# Patient Record
Sex: MALE | Race: WHITE | Employment: UNEMPLOYED | URBAN - METROPOLITAN AREA
[De-identification: names, ages, dates, MRNs, and addresses within clinical notes are randomized per-mention and may not be internally consistent; named-entity substitution may affect disease eponyms.]

---

## 2018-12-11 ENCOUNTER — HOSPITAL ENCOUNTER (EMERGENCY)
Age: 28
Discharge: HOME | End: 2018-12-11
Attending: EMERGENCY MEDICINE
Payer: SELF-PAY

## 2018-12-11 VITALS
SYSTOLIC BLOOD PRESSURE: 111 MMHG | DIASTOLIC BLOOD PRESSURE: 53 MMHG | RESPIRATION RATE: 16 BRPM | BODY MASS INDEX: 18.83 KG/M2 | HEIGHT: 67 IN | OXYGEN SATURATION: 98 % | TEMPERATURE: 98.8 F | WEIGHT: 120 LBS | HEART RATE: 63 BPM

## 2018-12-11 DIAGNOSIS — T07.XXXA MULTIPLE ABRASIONS: ICD-10-CM

## 2018-12-11 DIAGNOSIS — S51.812A LACERATION OF LEFT FOREARM, INITIAL ENCOUNTER: Primary | ICD-10-CM

## 2018-12-11 PROCEDURE — 77030018836 HC SOL IRR NACL ICUM -A

## 2018-12-11 PROCEDURE — 99283 EMERGENCY DEPT VISIT LOW MDM: CPT

## 2018-12-11 PROCEDURE — 74011000250 HC RX REV CODE- 250: Performed by: PHYSICIAN ASSISTANT

## 2018-12-11 PROCEDURE — 77030031132 HC SUT NYL COVD -A

## 2018-12-11 PROCEDURE — 75810000293 HC SIMP/SUPERF WND  RPR

## 2018-12-11 PROCEDURE — 74011250637 HC RX REV CODE- 250/637: Performed by: PHYSICIAN ASSISTANT

## 2018-12-11 RX ORDER — IBUPROFEN 800 MG/1
800 TABLET ORAL
Qty: 20 TAB | Refills: 0 | Status: SHIPPED | OUTPATIENT
Start: 2018-12-11 | End: 2018-12-18

## 2018-12-11 RX ORDER — BACITRACIN 500 UNIT/G
1 PACKET (EA) TOPICAL
Status: COMPLETED | OUTPATIENT
Start: 2018-12-11 | End: 2018-12-11

## 2018-12-11 RX ORDER — ACETAMINOPHEN AND CODEINE PHOSPHATE 300; 30 MG/1; MG/1
1 TABLET ORAL
Qty: 4 TAB | Refills: 0 | Status: SHIPPED | OUTPATIENT
Start: 2018-12-11 | End: 2019-07-14

## 2018-12-11 RX ORDER — IBUPROFEN 400 MG/1
800 TABLET ORAL
Status: COMPLETED | OUTPATIENT
Start: 2018-12-11 | End: 2018-12-11

## 2018-12-11 RX ADMIN — IBUPROFEN 800 MG: 400 TABLET ORAL at 18:38

## 2018-12-11 RX ADMIN — BACITRACIN ZINC 1 PACKET: 500 OINTMENT TOPICAL at 19:15

## 2018-12-11 NOTE — DISCHARGE INSTRUCTIONS
Cuts Closed With Stitches: Care Instructions  Your Care Instructions  A cut can happen anywhere on your body. The doctor used stitches to close the cut. Using stitches also helps the cut heal and reduces scarring. Sometimes pieces of tape called Steri-Strips are put over the stitches. If the cut went deep and through the skin, the doctor may have put in two layers of stitches. The deeper layer brings the deep part of the cut together. These stitches will dissolve and don't need to be removed. The stitches in the upper layer are the ones you see on the cut. You will probably have a bandage over the stitches. You will need to have the stitches removed, usually in 7 to 14 days. The doctor has checked you carefully, but problems can develop later. If you notice any problems or new symptoms, get medical treatment right away. Follow-up care is a key part of your treatment and safety. Be sure to make and go to all appointments, and call your doctor if you are having problems. It's also a good idea to know your test results and keep a list of the medicines you take. How can you care for yourself at home? · Keep the cut dry for the first 24 to 48 hours. After this, you can shower if your doctor okays it. Pat the cut dry. · Don't soak the cut, such as in a bathtub. Your doctor will tell you when it's safe to get the cut wet. · If your doctor told you how to care for your cut, follow your doctor's instructions. If you did not get instructions, follow this general advice:  ? After the first 24 to 48 hours, wash around the cut with clean water 2 times a day. Don't use hydrogen peroxide or alcohol, which can slow healing. ? You may cover the cut with a thin layer of petroleum jelly, such as Vaseline, and a nonstick bandage. ? Apply more petroleum jelly and replace the bandage as needed. · Prop up the sore area on a pillow anytime you sit or lie down during the next 3 days.  Try to keep it above the level of your heart. This will help reduce swelling. · Avoid any activity that could cause your cut to reopen. · Do not remove the stitches on your own. Your doctor will tell you when to come back to have the stitches removed. · Leave Steri-Strips on until they fall off. · Be safe with medicines. Read and follow all instructions on the label. ? If the doctor gave you a prescription medicine for pain, take it as prescribed. ? If you are not taking a prescription pain medicine, ask your doctor if you can take an over-the-counter medicine. When should you call for help? Call your doctor now or seek immediate medical care if:    · You have new pain, or your pain gets worse.     · The skin near the cut is cold or pale or changes color.     · You have tingling, weakness, or numbness near the cut.     · The cut starts to bleed, and blood soaks through the bandage. Oozing small amounts of blood is normal.     · You have trouble moving the area near the cut.     · You have symptoms of infection, such as:  ? Increased pain, swelling, warmth, or redness around the cut.  ? Red streaks leading from the cut.  ? Pus draining from the cut.  ? A fever.    Watch closely for changes in your health, and be sure to contact your doctor if:    · The cut reopens.     · You do not get better as expected. Where can you learn more? Go to http://sid-isabel.info/. Enter R217 in the search box to learn more about \"Cuts Closed With Stitches: Care Instructions. \"  Current as of: November 20, 2017  Content Version: 11.8  © 6593-0691 [employer]. Care instructions adapted under license by [employer] (which disclaims liability or warranty for this information). If you have questions about a medical condition or this instruction, always ask your healthcare professional. [address] any warranty or liability for your use of this information.

## 2018-12-11 NOTE — ED PROVIDER NOTES
The history is provided by the patient. Laceration    The incident occurred less than 1 hour ago. Pain location: Left forearm. The laceration is 1 cm in size. The injury mechanism is a metal edge (Pt was cutting fishing line for crafting and cut his arm with scissors. ). Foreign body present: no. The pain is at a severity of 8/10. The pain has been constant since onset. Pertinent negatives include no numbness, no tingling, no weakness, no loss of motion, no coolness and no discoloration. The patient's last tetanus shot was less than 5 years (6 months ago) ago. Pt has not tried anything for pain, movement worsens pain. Also has several surrounding scratches. NO other complaints at this time. Past Medical History:   Diagnosis Date    [facility]     Tourette's disease        History reviewed. No pertinent surgical history. History reviewed. No pertinent family history. Social History     Socioeconomic History    Marital status: SINGLE     Spouse name: Not on file    Number of children: Not on file    Years of education: Not on file    Highest education level: Not on file   Social Needs    Financial resource strain: Not on file    Food insecurity - worry: Not on file    Food insecurity - inability: Not on file    Transportation needs - medical: Not on file   [employer] needs - non-medical: Not on file   Occupational History    Not on file   Tobacco Use    Smoking status: Never Smoker    Smokeless tobacco: Never Used   Substance and Sexual Activity    Alcohol use: Yes    Drug use: Yes     Types: Marijuana    Sexual activity: Not on file   Other Topics Concern    Not on file   Social History Narrative    Not on file         ALLERGIES: Seroquel [quetiapine]    Review of Systems   Constitutional: Negative for chills and fever. HENT: Negative for ear pain, rhinorrhea and sore throat. Eyes: Negative for pain and redness. Respiratory: Negative for cough and shortness of breath. Cardiovascular: Negative for chest pain. Gastrointestinal: Negative for abdominal pain, constipation, diarrhea, nausea and vomiting. Genitourinary: Negative for dysuria. Skin: Positive for wound. Neurological: Negative for dizziness, tingling, weakness, light-headedness, numbness and headaches. Psychiatric/Behavioral: Negative. All other systems reviewed and are negative. Vitals:    12/11/18 1801   BP: 111/53   Pulse: 63   Resp: 16   Temp: 98.8 °F (37.1 °C)   SpO2: 98%   Weight: 54.4 kg (120 lb)   Height: 5' 7\" (1.702 m)            Physical Exam   Constitutional: He is oriented to person, place, and time. He appears well-developed and well-nourished. HENT:   Head: Normocephalic and atraumatic. Right Ear: Tympanic membrane, external ear and ear canal normal.   Left Ear: Tympanic membrane, external ear and ear canal normal.   Nose: Nose normal.   Mouth/Throat: Oropharynx is clear and moist and mucous membranes are normal.   Eyes: Conjunctivae and EOM are normal. Pupils are equal, round, and reactive to light. Neck: Normal range of motion. Cardiovascular: Normal rate, regular rhythm, normal heart sounds and intact distal pulses. Pulmonary/Chest: Effort normal and breath sounds normal.   Abdominal: Soft. Bowel sounds are normal. There is no tenderness. Musculoskeletal: Normal range of motion. Left elbow: Normal.        Left forearm: He exhibits tenderness and laceration. He exhibits no bony tenderness, no swelling, no edema and no deformity. Left hand: He exhibits normal capillary refill, no laceration and no swelling. Normal sensation noted. He exhibits no finger abduction and no thumb/finger opposition. Left inner forearm with 1.5 cm laceration, some slight active non-pulsatile bleeding. Multiple superficial scratches on forearm surrounding site. Distal sensation intact, Cap refill <3 sec, radial pulse 2+.     Neurological: He is alert and oriented to person, place, and time.   Skin: Skin is warm and dry. Capillary refill takes less than 2 seconds. Laceration (See above) noted. Psychiatric: He has a normal mood and affect. His behavior is normal. Judgment and thought content normal.   Nursing note and vitals reviewed. MDM  Number of Diagnoses or Management Options  Laceration of left forearm, initial encounter: new and requires workup  Multiple abrasions: new and requires workup  Diagnosis management comments: Pt presents for 1.5 laceration to forearm, reports from cutting on scissors. On exam small single laceration requiring suturing, and some other surround superficial abrasions pt reports are a few days old d/t a thorny bush. Pt denies self injury or intentional injury. D/w pt will numb area with lidocaine. Pt reports tetanus UTD. Neurovascularly intact prior and post procedure. Suture removal in 7-10 days. Pt results have been reviewed with the patient and any family present. They have been counseled regarding diagnosis, treatment, and plan. They verbally convey understanding and agreement of the signs, symptoms, diagnosis, treatment and prognosis and additionally agrees to follow up as discussed. They also agree with the care-plan and convey that all of their questions have been answered. I have also provided discharge instructions for them that include: educational information regarding their diagnosis and treatment, and list of reasons why they would want to return to the ED prior to their follow-up appointment, should their condition change.    Miguelito Lynn PA-C         Amount and/or Complexity of Data Reviewed  Review and summarize past medical records: yes  Discuss the patient with other providers: yes    Risk of Complications, Morbidity, and/or Mortality  Presenting problems: low  Diagnostic procedures: low  Management options: low    Patient Progress  Patient progress: stable         Wound Repair  Date/Time: 12/11/2018 7:16 PM  Performed by: Linh Potts provider: Dr. Julianna Cline   Preparation: skin prepped with Betadine and sterile field established  Pre-procedure re-eval: Immediately prior to the procedure, the patient was reevaluated and found suitable for the planned procedure and any planned medications. Time out: Immediately prior to the procedure a time out was called to verify the correct patient, procedure, equipment, staff and marking as appropriate. .  Location: Left forearm. Wound length:2.5 cm or less  Anesthesia: local infiltration    Anesthesia:  Local Anesthetic: lidocaine 1% without epinephrine  Anesthetic total: 3 mL  Foreign bodies: no foreign bodies  Irrigation solution: saline  Debridement: none  Skin closure: 4-0 nylon  Number of sutures: 2  Technique: simple  Approximation: close  Dressing: antibiotic ointment  Patient tolerance: Patient tolerated the procedure well with no immediate complications  My total time at bedside, performing this procedure was 1-15 minutes. Diagnosis:   1. Laceration of left forearm, initial encounter    2. Multiple abrasions          Disposition: Discharge to home. Follow-up Information     Follow up With Specialties Details Why [facility] EMERGENCY DEPT Emergency Medicine Go in 10 days As needed, If symptoms worsen, For suture removal [facility]  Go in 3 days  [address] [facility] [place] [address]  [phone]             Medication List      START taking these medications    acetaminophen-codeine 300-30 mg per tablet  Commonly known as:  TYLENOL-CODEINE #3  Take 1 Tab by mouth every six (6) hours as needed for Pain. Max Daily Amount: 4 Tabs. ibuprofen 800 mg tablet  Commonly known as:  MOTRIN  Take 1 Tab by mouth every six (6) hours as needed for Pain for up to 7 days.            Where to Get Your Medications      Information about where to get these medications is not yet available    Ask your nurse or doctor about these medications  · acetaminophen-codeine 300-30 mg per tablet  · ibuprofen 800 mg tablet

## 2018-12-11 NOTE — ED TRIAGE NOTES
Per EMS, Yeni Forbesbindfatou is complaining of left wrist pain after cutting himself accidentally.  He has about a 1 inch laceration to left wrist, bleeding was controlled on arrival.\"

## 2018-12-12 NOTE — ED NOTES
Discharge information reviewed with patient, patient verbalized understanding. Paperwork signed. Patient asking about medication costs, provided information about GoodRx for possible medication discounts. Patient verbalized understanding.

## 2019-03-11 ENCOUNTER — HOSPITAL ENCOUNTER (EMERGENCY)
Age: 29
Discharge: ANOTHER HOSPITAL | End: 2019-03-12
Attending: EMERGENCY MEDICINE
Payer: COMMERCIAL

## 2019-03-11 VITALS
HEART RATE: 88 BPM | TEMPERATURE: 97.2 F | SYSTOLIC BLOOD PRESSURE: 103 MMHG | RESPIRATION RATE: 12 BRPM | DIASTOLIC BLOOD PRESSURE: 64 MMHG | OXYGEN SATURATION: 98 %

## 2019-03-11 DIAGNOSIS — R45.851 SUICIDAL IDEATIONS: Primary | ICD-10-CM

## 2019-03-11 LAB
AMPHET UR QL SCN: 1
ANION GAP SERPL CALC-SCNC: 4 MMOL/L (ref 3–18)
BARBITURATES UR QL SCN: 0
BASOPHILS # BLD: 0 K/UL (ref 0–0.1)
BASOPHILS NFR BLD: 0 % (ref 0–2)
BENZODIAZ UR QL: 0
BUN SERPL-MCNC: 11 MG/DL (ref 7–18)
BUN/CREAT SERPL: 13 (ref 12–20)
CALCIUM SERPL-MCNC: 9 MG/DL (ref 8.5–10.1)
CANNABINOIDS UR QL SCN: 1
CHLORIDE SERPL-SCNC: 103 MMOL/L (ref 100–108)
CO2 SERPL-SCNC: 32 MMOL/L (ref 21–32)
COCAINE UR QL SCN: 0
CREAT SERPL-MCNC: 0.88 MG/DL (ref 0.6–1.3)
DIFFERENTIAL METHOD BLD: ABNORMAL
EOSINOPHIL # BLD: 0.1 K/UL (ref 0–0.4)
EOSINOPHIL NFR BLD: 2 % (ref 0–5)
ERYTHROCYTE [DISTWIDTH] IN BLOOD BY AUTOMATED COUNT: 12.7 % (ref 11.6–14.5)
ETHANOL SERPL-MCNC: <3 MG/DL (ref 0–3)
GLUCOSE SERPL-MCNC: 88 MG/DL (ref 74–99)
HCT VFR BLD AUTO: 41.5 % (ref 36–48)
HDSCOM,HDSCOM: ABNORMAL
HGB BLD-MCNC: 13.5 G/DL (ref 13–16)
LYMPHOCYTES # BLD: 1.8 K/UL (ref 0.9–3.6)
LYMPHOCYTES NFR BLD: 30 % (ref 21–52)
MCH RBC QN AUTO: 28.9 PG (ref 24–34)
MCHC RBC AUTO-ENTMCNC: 32.5 G/DL (ref 31–37)
MCV RBC AUTO: 88.9 FL (ref 74–97)
METHADONE UR QL: 0
MONOCYTES # BLD: 0.4 K/UL (ref 0.05–1.2)
MONOCYTES NFR BLD: 7 % (ref 3–10)
NEUTS SEG # BLD: 3.8 K/UL (ref 1.8–8)
NEUTS SEG NFR BLD: 61 % (ref 40–73)
OPIATES UR QL: 0
PCP UR QL: 0
PLATELET # BLD AUTO: 223 K/UL (ref 135–420)
PMV BLD AUTO: 9.8 FL (ref 9.2–11.8)
POTASSIUM SERPL-SCNC: 3.4 MMOL/L (ref 3.5–5.5)
RBC # BLD AUTO: 4.67 M/UL (ref 4.7–5.5)
SODIUM SERPL-SCNC: 139 MMOL/L (ref 136–145)
WBC # BLD AUTO: 6.1 K/UL (ref 4.6–13.2)

## 2019-03-11 PROCEDURE — 85025 COMPLETE CBC W/AUTO DIFF WBC: CPT

## 2019-03-11 PROCEDURE — 99285 EMERGENCY DEPT VISIT HI MDM: CPT

## 2019-03-11 PROCEDURE — 80048 BASIC METABOLIC PNL TOTAL CA: CPT

## 2019-03-11 PROCEDURE — 96372 THER/PROPH/DIAG INJ SC/IM: CPT

## 2019-03-11 PROCEDURE — 80307 DRUG TEST PRSMV CHEM ANLYZR: CPT

## 2019-03-11 RX ORDER — SERTRALINE HYDROCHLORIDE 50 MG/1
100 TABLET, FILM COATED ORAL DAILY
Status: DISCONTINUED | OUTPATIENT
Start: 2019-03-12 | End: 2019-03-12 | Stop reason: HOSPADM

## 2019-03-11 RX ORDER — RISPERIDONE 1 MG/1
4 TABLET, FILM COATED ORAL
Status: DISCONTINUED | OUTPATIENT
Start: 2019-03-11 | End: 2019-03-12 | Stop reason: HOSPADM
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
## 2019-03-11 NOTE — ANCILLARY DISCHARGE INSTRUCTIONS
Call made to [facility] [phone], spoke with [address], information faxed for possible placement to [address] [place] [address], [facility], [place] [address], [facility], and [facility].
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
## 2019-03-11 NOTE — ED NOTES
Vitals:  Patient Vitals for the past 12 hrs:   Temp Pulse Resp BP SpO2   03/11/19 2329 97.2 °F (36.2 °C) 88 12 103/64 98 %   03/11/19 2155 97 °F (36.1 °C) 86 16 (!) 83/48 98 %         Medications ordered:   Medications   sertraline (ZOLOFT) tablet 100 mg (not administered)   risperiDONE (RisperDAL) tablet 4 mg (0 mg Oral Held 3/11/19 1742)   cefTRIAXone (ROCEPHIN) 250 mg in lidocaine (PF) (XYLOCAINE) 10 mg/mL (1 %) IM injection (250 mg IntraMUSCular Given 3/12/19 0052)   azithromycin (ZITHROMAX) tablet 1,000 mg (1,000 mg Oral Given 3/12/19 0052)         Lab findings:  Recent Results (from the past 12 hour(s))   DRUG SCREEN, URINE    Collection Time: 03/11/19  1:18 PM   Result Value Ref Range    BENZODIAZEPINES 0 (A) NEG      BARBITURATES 0 (A) NEG      THC (TH-CANNABINOL) 1 (A) NEG      OPIATES 0 (A) NEG      PCP(PHENCYCLIDINE) 0 (A) NEG      COCAINE 0 (A) NEG      AMPHETAMINES 1 (A) NEG      METHADONE 0 (A) NEG      HDSCOM (NOTE)        EKG interpretation by ED Physician:      X-Ray, CT or other radiology findings or impressions:  CT HEAD WO CONT    (Results Pending)       Progress notes, Consult notes or additional Procedure notes:   Patient Martinez Calvert from Dr. Sri Evans pending placement for suicidal ideation. Patient has been accepted for transfer. emtalal has been completed. Patient was seen by the [facility] nurse and had exam performed. She did recommend treating with Rocephin and Zithromax. Patient was noted to have one episode where he became slightly unresponsive but came to right away without any postictal.  He states he had some buzzing in his head which he has had before. Patient also was noted to have some small contusion to his upper middle forehead. A head CT was also performed which did not show any acute abnormality per preliminary report. Patient is stable and appropriate for transfer to a mental health facility. Reevaluation of patient:   stable    Disposition:  Diagnosis:   1.  Suicidal ideations        Disposition: transfer    Follow-up Information    None              Medication List      ASK your doctor about these medications    acetaminophen-codeine 300-30 mg per tablet  Commonly known as:  TYLENOL-CODEINE #3  Take 1 Tab by mouth every six (6) hours as needed for Pain. Max Daily Amount: 4 Tabs.

## 2019-03-11 NOTE — ED TRIAGE NOTES
Pt continually asked to get off phone. Pt continuing to use phone.  Stating he needs to write down numbers

## 2019-03-11 NOTE — ED NOTES
spoke with patient. Currently calling in a report. States he will let me know about SANE nurse after phone call.
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
## 2019-03-12 ENCOUNTER — APPOINTMENT (OUTPATIENT)
Dept: CT IMAGING | Age: 29
End: 2019-03-12
Attending: EMERGENCY MEDICINE
Payer: COMMERCIAL

## 2019-03-12 PROCEDURE — 70450 CT HEAD/BRAIN W/O DYE: CPT

## 2019-03-12 PROCEDURE — 74011250636 HC RX REV CODE- 250/636: Performed by: EMERGENCY MEDICINE

## 2019-03-12 PROCEDURE — 74011250637 HC RX REV CODE- 250/637: Performed by: EMERGENCY MEDICINE

## 2019-03-12 PROCEDURE — 96372 THER/PROPH/DIAG INJ SC/IM: CPT

## 2019-03-12 RX ORDER — AZITHROMYCIN 250 MG/1
1000 TABLET, FILM COATED ORAL
Status: COMPLETED | OUTPATIENT
Start: 2019-03-12 | End: 2019-03-12

## 2019-03-12 RX ADMIN — AZITHROMYCIN 1000 MG: 250 TABLET, FILM COATED ORAL at 00:52

## 2019-03-12 RX ADMIN — LIDOCAINE HYDROCHLORIDE 250 MG: 10 INJECTION, SOLUTION EPIDURAL; INFILTRATION; INTRACAUDAL; PERINEURAL at 00:52

## 2019-03-12 NOTE — ANCILLARY DISCHARGE INSTRUCTIONS
Patient has been accepted to [facility], [address] [facility] [address] by Dr. Scarlette Sandhoff. Patient is going to room 583. Nurse should call report to [phone]. ED is aware.

## 2019-03-12 NOTE — ED NOTES
Spoke with Edna Guthrie from [facility] (facility finder). Potential spot for patient in [facility]. Need more information on patient. She will call back.

## 2019-03-12 NOTE — ED NOTES
Pt left via medical transport. Valuables and belongings returned.   [facility] nurses called and notified the the patient was heading there

## 2019-07-14 ENCOUNTER — HOSPITAL ENCOUNTER (EMERGENCY)
Age: 29
Discharge: HOME | End: 2019-07-14
Attending: EMERGENCY MEDICINE
Payer: MEDICAID

## 2019-07-14 VITALS
SYSTOLIC BLOOD PRESSURE: 132 MMHG | BODY MASS INDEX: 18.83 KG/M2 | HEART RATE: 103 BPM | OXYGEN SATURATION: 97 % | HEIGHT: 67 IN | RESPIRATION RATE: 16 BRPM | WEIGHT: 120 LBS | DIASTOLIC BLOOD PRESSURE: 73 MMHG | TEMPERATURE: 98 F

## 2019-07-14 DIAGNOSIS — B82.9 PRESENCE OF PARASITES IN STOOL: Primary | ICD-10-CM

## 2019-07-14 PROCEDURE — 99282 EMERGENCY DEPT VISIT SF MDM: CPT

## 2019-07-14 NOTE — ED NOTES
I have reviewed discharge instructions with the patient. The patient verbalized understanding. Patient armband removed and given to patient to take home. Patient was informed of the privacy risks if armband lost or stolen    The patient Ryan Morales is a 34 y.o. male who  has a past medical history of Seizures ([address] and Tourette's disease. Ana Dodge   The patient's medications were reviewed and discussed prior to discharge. The patient was very interactive and did understand their medications. The following medications were discussed in details with the patient. The patient said they are using  [facility] as their [facility] pharmacy. @[email or web address]@    [facility] [employer]    Thank you for requesting access to [employer] [facility]. Please follow the instructions below to securely access and download your online medical record. [employer] allows you to send messages to your doctor, view your test results, renew your prescriptions, schedule appointments, and more. How Do I Sign Up? 1. In your internet browser, go to [email or web address]  2. Click on the First Time User? Click Here link in the Sign In box. You will be redirect to the New Member Sign Up page. 3. Enter your [employer] Access Code exactly as it appears below. You will not need to use this code after youve completed the sign-up process. If you do not sign up before the expiration date, you must request a new code. [employer] [facility] Access Code: [unfilled] (This is the date your [employer] access code will )    4. Enter the last four digits of your Social Security Number (xxxx) and Date of Birth (mm/dd/yyyy) as indicated and click Submit. You will be taken to the next sign-up page. 5. Create a [facility] ID. This will be your [facility] login ID and cannot be changed, so think of one that is secure and easy to remember. 6. Create a [facility] password. You can change your password at any time. 7. Enter your Password Reset Question and Answer. This can be used at a later time if you forget your password. 8. Enter your e-mail address. You will receive e-mail notification when new information is available in [address] 9. Click Sign Up. You can now view and download portions of your medical record. 10. Click the Download Summary menu link to download a portable copy of your medical information. Additional Information    If you have questions, please visit the Frequently Asked Questions section of the [employer] website at [email or web address] Remember, [employer] is NOT to be used for urgent needs. For medical emergencies, dial 911.

## 2019-07-14 NOTE — ED PROVIDER NOTES
EMERGENCY DEPARTMENT HISTORY AND PHYSICAL EXAM    Date: 2019  Patient Name: Rich Orellana    History of Presenting Illness     Chief Complaint   Patient presents with    Anal Itching         History Provided By: Patient    Chief Complaint: Anal itching and concern for parasites in his stool  Duration: Constant  Timin to 3 weeks  Location: Anus  Quality: Itching  Severity: Moderate  Modifying Factors: Worse at night  Associated Symptoms: none       Additional History (Context): Rich Orellana is a 34 y.o. male with a history of seizures and Tourette disorder who presents today for 2 to 3-week history of anal itching and concern for parasites. Patient reports he has been seen for this by urgent care but they did not take him seriously. Patient did bring a sample of the parasite he found in his stool. Patient has not been treated for parasites in the past month. Patient reports anxiety concerning his health. PCP: None    Current Outpatient Medications   Medication Sig Dispense Refill    mebendazole (VERMOX) 100 mg chewable tablet Take one tablet today, and then repeat in two weeks 2 Tab 0       Past History     Past Medical History:  Past Medical History:   Diagnosis Date    Seizures ([address]     Tourette's disease        Past Surgical History:  History reviewed. No pertinent surgical history. Family History:  History reviewed. No pertinent family history. Social History:  Social History     Tobacco Use    Smoking status: Never Smoker    Smokeless tobacco: Never Used   Substance Use Topics    Alcohol use: Yes    Drug use: Yes     Types: Marijuana       Allergies: Allergies   Allergen Reactions    Seroquel [Quetiapine] Unknown (comments)         Review of Systems   Review of Systems   Constitutional: Negative for chills and fever. HENT: Negative for congestion, rhinorrhea and sore throat. Respiratory: Negative for cough and shortness of breath.     Cardiovascular: Negative for chest pain.   Gastrointestinal: Negative for abdominal pain, blood in stool, constipation, diarrhea, nausea and vomiting. Anal itching   Genitourinary: Negative for dysuria, frequency and hematuria. Musculoskeletal: Negative for back pain and myalgias. Skin: Negative for rash and wound. Neurological: Negative for dizziness and headaches. All other systems reviewed and are negative. All Other Systems Negative  Physical Exam     Vitals:    07/14/19 1201   BP: 132/73   Pulse: (!) 103   Resp: 16   Temp: 98 °F (36.7 °C)   SpO2: 97%   Weight: 54.4 kg (120 lb)   Height: 5' 7\" (1.702 m)     Physical Exam   Constitutional: He is oriented to person, place, and time. He appears well-developed and well-nourished. No distress. HENT:   Head: Normocephalic and atraumatic. Eyes: Conjunctivae are normal.   Neck: Normal range of motion. Neck supple. Cardiovascular: Normal rate, regular rhythm and normal heart sounds. Pulmonary/Chest: Effort normal and breath sounds normal. No respiratory distress. He exhibits no tenderness. Abdominal: Soft. Bowel sounds are normal. He exhibits no distension. There is no tenderness. There is no rebound and no guarding. Genitourinary:   Genitourinary Comments: Did not examine   Musculoskeletal: Normal range of motion. He exhibits no edema or deformity. Neurological: He is alert and oriented to person, place, and time. Skin: Skin is warm and dry. He is not diaphoretic. Psychiatric: His mood appears anxious. Nursing note and vitals reviewed. Diagnostic Study Results     Labs -   No results found for this or any previous visit (from the past 12 hour(s)). Radiologic Studies -   No orders to display     CT Results  (Last 48 hours)    None        CXR Results  (Last 48 hours)    None            Medical Decision Making   I am the first provider for this patient.     I reviewed the vital signs, available nursing notes, past medical history, past surgical history, family history and social history. Vital Signs-Reviewed the patient's vital signs. Records Reviewed: Nursing Notes and Old Medical Records     Procedures: None   Procedures    Provider Notes (Medical Decision Making):      differential: Pinworms, abscess    Plan: History and physical exam very consistent with pinworms. Sample patient brought and also consistent with pinworms. No emergent intervention needed at this time will discharge home with Vermox and have educated patient on how to take this medication. Heart rate mildly elevated likely due to anxiety  concerning his health. Patient agrees with the plan and management and states all questions have been thoroughly answered and there are no more remaining questions. MED RECONCILIATION:  No current facility-administered medications for this encounter. Current Outpatient Medications   Medication Sig    mebendazole (VERMOX) 100 mg chewable tablet Take one tablet today, and then repeat in two weeks       Disposition:  Home     DISCHARGE NOTE:   Pt has been reexamined. Patient has no new complaints, changes, or physical findings. Care plan outlined and precautions discussed. Results of workup were reviewed with the patient. All medications were reviewed with the patient. All of pt's questions and concerns were addressed. Patient was instructed and agrees to follow up with PCP  as well as to return to the ED upon further deterioration. Patient is ready to go home.     Follow-up Information     Follow up With Specialties Details Why Contact [facility] EMERGENCY DEPT Emergency Medicine  As needed [address] [place] [address]    [address] ([address])  In 2 days  [facility]  [phone]          Current Discharge Medication List      START taking these medications    Details   mebendazole (VERMOX) 100 mg chewable tablet Take one tablet today, and then repeat in two weeks  Qty: 2 Tab, Refills: 0                 Diagnosis     Clinical Impression:   1.  Presence of parasites in stool

## 2019-07-14 NOTE — LETTER
[facility] EMERGENCY DEPT 
[address] [place] [id number] 
[phone] Work/School Note Date: 7/14/2019 To Whom It May concern: 
 
Senthil Corey was seen and treated today in the emergency room by the following provider(s): 
Attending Provider: Carol Fields MD 
Physician Assistant: Mony Knapp. Senthil Corey may return to work or school on 7/15/19 Sincerely, Mony Bearden

## 2019-07-15 ENCOUNTER — APPOINTMENT (OUTPATIENT)
Dept: GENERAL RADIOLOGY | Age: 29
End: 2019-07-15
Attending: EMERGENCY MEDICINE
Payer: MEDICAID

## 2019-07-15 ENCOUNTER — HOSPITAL ENCOUNTER (EMERGENCY)
Age: 29
Discharge: HOME | End: 2019-07-15
Attending: EMERGENCY MEDICINE
Payer: MEDICAID

## 2019-07-15 VITALS
WEIGHT: 120 LBS | OXYGEN SATURATION: 95 % | DIASTOLIC BLOOD PRESSURE: 67 MMHG | TEMPERATURE: 98.3 F | HEIGHT: 68 IN | SYSTOLIC BLOOD PRESSURE: 103 MMHG | BODY MASS INDEX: 18.19 KG/M2 | HEART RATE: 97 BPM | RESPIRATION RATE: 18 BRPM

## 2019-07-15 DIAGNOSIS — S20.211A CONTUSION OF RIGHT CHEST WALL, INITIAL ENCOUNTER: ICD-10-CM
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
## 2019-07-15 PROCEDURE — 99284 EMERGENCY DEPT VISIT MOD MDM: CPT

## 2019-07-15 PROCEDURE — 71045 X-RAY EXAM CHEST 1 VIEW: CPT

## 2019-07-15 PROCEDURE — 99283 EMERGENCY DEPT VISIT LOW MDM: CPT
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
## 2019-07-15 NOTE — ANCILLARY DISCHARGE INSTRUCTIONS
Spoke with patient, patient denies SI/HI, patient has not followed up with Psychiatric care since  being discharged from [facility] in March.  Patient will follow up with [facility] tomorrow 7/16/19 for intake, they open at 8am, patient will arrive before 4pm.

## 2019-12-02 ENCOUNTER — HOSPITAL ENCOUNTER (EMERGENCY)
Age: 29
Discharge: HOME | End: 2019-12-03
Attending: EMERGENCY MEDICINE
Payer: MEDICAID

## 2019-12-02 VITALS
HEART RATE: 97 BPM | SYSTOLIC BLOOD PRESSURE: 117 MMHG | DIASTOLIC BLOOD PRESSURE: 82 MMHG | HEIGHT: 67 IN | OXYGEN SATURATION: 97 % | RESPIRATION RATE: 16 BRPM | TEMPERATURE: 98.6 F | WEIGHT: 115 LBS | BODY MASS INDEX: 18.05 KG/M2

## 2019-12-02 DIAGNOSIS — K62.5 RECTAL BLEEDING: Primary | ICD-10-CM

## 2019-12-02 LAB
ALBUMIN SERPL-MCNC: 4.2 G/DL (ref 3.4–5)
ALBUMIN/GLOB SERPL: 1.3 {RATIO} (ref 0.8–1.7)
ALP SERPL-CCNC: 53 U/L (ref 45–117)
ALT SERPL-CCNC: 30 U/L (ref 16–61)
ANION GAP SERPL CALC-SCNC: 5 MMOL/L (ref 3–18)
AST SERPL-CCNC: 29 U/L (ref 10–38)
BASOPHILS # BLD: 0 K/UL (ref 0–0.1)
BASOPHILS NFR BLD: 0 % (ref 0–2)
BILIRUB SERPL-MCNC: 0.5 MG/DL (ref 0.2–1)
BUN SERPL-MCNC: 7 MG/DL (ref 7–18)
BUN/CREAT SERPL: 8 (ref 12–20)
CALCIUM SERPL-MCNC: 9.2 MG/DL (ref 8.5–10.1)
CHLORIDE SERPL-SCNC: 105 MMOL/L (ref 100–111)
CO2 SERPL-SCNC: 29 MMOL/L (ref 21–32)
CREAT SERPL-MCNC: 0.85 MG/DL (ref 0.6–1.3)
DIFFERENTIAL METHOD BLD: ABNORMAL
EOSINOPHIL # BLD: 0.1 K/UL (ref 0–0.4)
EOSINOPHIL NFR BLD: 1 % (ref 0–5)
ERYTHROCYTE [DISTWIDTH] IN BLOOD BY AUTOMATED COUNT: 12.5 % (ref 11.6–14.5)
GLOBULIN SER CALC-MCNC: 3.2 G/DL (ref 2–4)
GLUCOSE SERPL-MCNC: 92 MG/DL (ref 74–99)
HCT VFR BLD AUTO: 39.1 % (ref 36–48)
HGB BLD-MCNC: 13.3 G/DL (ref 13–16)
LYMPHOCYTES # BLD: 2.6 K/UL (ref 0.9–3.6)
LYMPHOCYTES NFR BLD: 27 % (ref 21–52)
MCH RBC QN AUTO: 30.2 PG (ref 24–34)
MCHC RBC AUTO-ENTMCNC: 34 G/DL (ref 31–37)
MCV RBC AUTO: 88.7 FL (ref 74–97)
MONOCYTES # BLD: 0.5 K/UL (ref 0.05–1.2)
MONOCYTES NFR BLD: 5 % (ref 3–10)
NEUTS SEG # BLD: 6.5 K/UL (ref 1.8–8)
NEUTS SEG NFR BLD: 67 % (ref 40–73)
PLATELET # BLD AUTO: 301 K/UL (ref 135–420)
PMV BLD AUTO: 10.2 FL (ref 9.2–11.8)
POTASSIUM SERPL-SCNC: 3.2 MMOL/L (ref 3.5–5.5)
PROT SERPL-MCNC: 7.4 G/DL (ref 6.4–8.2)
RBC # BLD AUTO: 4.41 M/UL (ref 4.7–5.5)
SODIUM SERPL-SCNC: 139 MMOL/L (ref 136–145)
WBC # BLD AUTO: 9.6 K/UL (ref 4.6–13.2)

## 2019-12-02 PROCEDURE — 80053 COMPREHEN METABOLIC PANEL: CPT

## 2019-12-02 PROCEDURE — 99282 EMERGENCY DEPT VISIT SF MDM: CPT

## 2019-12-02 PROCEDURE — 85025 COMPLETE CBC W/AUTO DIFF WBC: CPT

## 2019-12-03 NOTE — ED NOTES
I performed a brief history of the patient here in triage and I have determined that pt will need further treatment and evaluation from the main side ER physician. I have placed initial orders based on the history to help in expediting patients care. Patient reports that he is having \"gushing bloody bowel movements\". Vitals are stable in triage. Denies IVDU or fevers.     Visit Vitals  /82 (BP 1 Location: Right arm, BP Patient Position: At rest)   Pulse 97   Temp 98.6 °F (37 °C)   Resp 16   Ht 5' 7\" (1.702 m)   Wt 52.2 kg (115 lb)   SpO2 97%   BMI 18.01 kg/m²     Yuan Stacy PA-C  9:34 PM

## 2019-12-03 NOTE — DISCHARGE INSTRUCTIONS
Patient Education        Rectal Bleeding: Care Instructions  Your Care Instructions    Rectal bleeding in small amounts is common. You may see red spotting on toilet paper or drops of blood in the toilet. Rectal bleeding has many possible causes, from something as minor as hemorrhoids to something as serious as colon cancer. You may need more tests to find the cause of your bleeding. Follow-up care is a key part of your treatment and safety. Be sure to make and go to all appointments, and call your doctor if you are having problems. It's also a good idea to know your test results and keep a list of the medicines you take. How can you care for yourself at home? · Avoid aspirin and other nonsteroidal anti-inflammatory drugs (NSAIDs), such as ibuprofen (Advil, Motrin) and naproxen (Aleve). They can cause you to bleed more. Ask your doctor if you can take acetaminophen (Tylenol). Read and follow all instructions on the label. · Use a stool softener that contains bran or psyllium. You can save money by buying bran or psyllium (available in bulk at most health food stores) and sprinkling it on foods or stirring it into fruit juice. You can also use a product such as Metamucil or Citrucel. · Take your medicines exactly as directed. Call your doctor if you think you are having a problem with your medicine. When should you call for help? Call 911 anytime you think you may need emergency care. For example, call if:    · You passed out (lost consciousness).    Call your doctor now or seek immediate medical care if:    · You have new or worse pain.     · You have new or worse bleeding from the rectum.     · You are dizzy or light-headed, or you feel like you may faint.    Watch closely for changes in your health, and be sure to contact your doctor if:    · You cannot pass stools or gas.     · You do not get better as expected. Where can you learn more? Go to http://sid-isabel.info/.   Enter I057 in the search box to learn more about \"Rectal Bleeding: Care Instructions. \"  Current as of: November 7, 2018  Content Version: 12.2  © 8510-8973 [employer]. Care instructions adapted under license by [employer] (which disclaims liability or warranty for this information). If you have questions about a medical condition or this instruction, always ask your healthcare professional. [place] [address] any warranty or liability for your use of this information.
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
